# Patient Record
Sex: MALE | Race: WHITE | Employment: OTHER | URBAN - METROPOLITAN AREA
[De-identification: names, ages, dates, MRNs, and addresses within clinical notes are randomized per-mention and may not be internally consistent; named-entity substitution may affect disease eponyms.]

---

## 2017-03-23 ENCOUNTER — HOSPITAL ENCOUNTER (OUTPATIENT)
Age: 72
Setting detail: SPECIMEN
Discharge: HOME | End: 2017-03-23
Payer: MEDICARE

## 2017-03-23 LAB
ALBUMIN SERPL-MCNC: 4 G/DL (ref 3.5–5.2)
ALBUMIN/GLOBULIN RATIO: 1.4 (ref 1–2.5)
ALP BLD-CCNC: 123 U/L (ref 40–129)
ALT SERPL-CCNC: 24 U/L (ref 5–41)
ANION GAP SERPL CALCULATED.3IONS-SCNC: 13 MMOL/L (ref 9–17)
AST SERPL-CCNC: 16 U/L
BILIRUB SERPL-MCNC: 0.56 MG/DL (ref 0.3–1.2)
BILIRUBIN URINE: NEGATIVE
BUN BLDV-MCNC: 12 MG/DL (ref 8–23)
BUN/CREAT BLD: ABNORMAL (ref 9–20)
C-REACTIVE PROTEIN: 2.9 MG/L (ref 0–5)
CALCIUM SERPL-MCNC: 9.3 MG/DL (ref 8.6–10.4)
CHLORIDE BLD-SCNC: 103 MMOL/L (ref 98–107)
CHOLESTEROL/HDL RATIO: 2.7
CHOLESTEROL: 166 MG/DL
CO2: 25 MMOL/L (ref 20–31)
COLOR: YELLOW
COMMENT UA: NORMAL
CREAT SERPL-MCNC: 0.67 MG/DL (ref 0.7–1.2)
GFR AFRICAN AMERICAN: >60 ML/MIN
GFR NON-AFRICAN AMERICAN: >60 ML/MIN
GFR SERPL CREATININE-BSD FRML MDRD: ABNORMAL ML/MIN/{1.73_M2}
GFR SERPL CREATININE-BSD FRML MDRD: ABNORMAL ML/MIN/{1.73_M2}
GLUCOSE BLD-MCNC: 99 MG/DL (ref 70–99)
GLUCOSE URINE: NEGATIVE
HCT VFR BLD CALC: 41.6 % (ref 41–53)
HDLC SERPL-MCNC: 61 MG/DL
HEMOGLOBIN: 14 G/DL (ref 13.5–17.5)
KETONES, URINE: NEGATIVE
LDL CHOLESTEROL: 92 MG/DL (ref 0–130)
LEUKOCYTE ESTERASE, URINE: NEGATIVE
MCH RBC QN AUTO: 30.7 PG (ref 26–34)
MCHC RBC AUTO-ENTMCNC: 33.5 G/DL (ref 31–37)
MCV RBC AUTO: 91.5 FL (ref 80–100)
NITRITE, URINE: NEGATIVE
PDW BLD-RTO: 13.8 % (ref 12.5–15.4)
PH UA: 6.5 (ref 5–8)
PLATELET # BLD: 168 K/UL (ref 140–450)
PMV BLD AUTO: 8.8 FL (ref 6–12)
POTASSIUM SERPL-SCNC: 4.5 MMOL/L (ref 3.7–5.3)
PROSTATE SPECIFIC ANTIGEN: 0.6 UG/L
PROTEIN UA: NEGATIVE
RBC # BLD: 4.55 M/UL (ref 4.5–5.9)
SODIUM BLD-SCNC: 141 MMOL/L (ref 135–144)
SPECIFIC GRAVITY UA: 1.02 (ref 1–1.03)
TOTAL PROTEIN: 6.9 G/DL (ref 6.4–8.3)
TRIGL SERPL-MCNC: 66 MG/DL
TSH SERPL DL<=0.05 MIU/L-ACNC: 1.71 MIU/L (ref 0.3–5)
TURBIDITY: CLEAR
URINE HGB: NEGATIVE
UROBILINOGEN, URINE: NORMAL
VLDLC SERPL CALC-MCNC: NORMAL MG/DL (ref 1–30)
WBC # BLD: 5.1 K/UL (ref 3.5–11)

## 2017-09-14 ENCOUNTER — HOSPITAL ENCOUNTER (OUTPATIENT)
Age: 72
Setting detail: SPECIMEN
Discharge: HOME | End: 2017-09-14
Payer: MEDICARE

## 2017-09-14 LAB
ANION GAP SERPL CALCULATED.3IONS-SCNC: 13 MMOL/L (ref 9–17)
BUN BLDV-MCNC: 20 MG/DL (ref 8–23)
BUN/CREAT BLD: ABNORMAL (ref 9–20)
CALCIUM SERPL-MCNC: 9.2 MG/DL (ref 8.6–10.4)
CHLORIDE BLD-SCNC: 109 MMOL/L (ref 98–107)
CO2: 25 MMOL/L (ref 20–31)
CREAT SERPL-MCNC: 0.84 MG/DL (ref 0.7–1.2)
GFR AFRICAN AMERICAN: >60 ML/MIN
GFR NON-AFRICAN AMERICAN: >60 ML/MIN
GFR SERPL CREATININE-BSD FRML MDRD: ABNORMAL ML/MIN/{1.73_M2}
GFR SERPL CREATININE-BSD FRML MDRD: ABNORMAL ML/MIN/{1.73_M2}
GLUCOSE BLD-MCNC: 104 MG/DL (ref 70–99)
POTASSIUM SERPL-SCNC: 5.2 MMOL/L (ref 3.7–5.3)
SODIUM BLD-SCNC: 147 MMOL/L (ref 135–144)

## 2017-10-30 ENCOUNTER — HOSPITAL ENCOUNTER (OUTPATIENT)
Dept: GENERAL RADIOLOGY | Facility: CLINIC | Age: 72
Discharge: HOME | End: 2017-10-30
Payer: MEDICARE

## 2017-10-30 DIAGNOSIS — M79.674 PAIN OF RIGHT GREAT TOE: ICD-10-CM

## 2017-10-30 PROCEDURE — 73630 X-RAY EXAM OF FOOT: CPT

## 2018-05-23 ENCOUNTER — HOSPITAL ENCOUNTER (OUTPATIENT)
Age: 73
Setting detail: SPECIMEN
Discharge: HOME | End: 2018-05-23
Payer: MEDICARE

## 2018-05-23 LAB
ABSOLUTE EOS #: 0.13 K/UL (ref 0–0.44)
ABSOLUTE IMMATURE GRANULOCYTE: <0.03 K/UL (ref 0–0.3)
ABSOLUTE LYMPH #: 1.77 K/UL (ref 1.1–3.7)
ABSOLUTE MONO #: 0.64 K/UL (ref 0.1–1.2)
ALBUMIN SERPL-MCNC: 4.4 G/DL (ref 3.5–5.2)
ALBUMIN/GLOBULIN RATIO: 1.5 (ref 1–2.5)
ALP BLD-CCNC: 109 U/L (ref 40–129)
ALT SERPL-CCNC: 25 U/L (ref 5–41)
ANION GAP SERPL CALCULATED.3IONS-SCNC: 16 MMOL/L (ref 9–17)
AST SERPL-CCNC: 22 U/L
BASOPHILS # BLD: 0 % (ref 0–2)
BASOPHILS ABSOLUTE: <0.03 K/UL (ref 0–0.2)
BILIRUB SERPL-MCNC: 0.45 MG/DL (ref 0.3–1.2)
BILIRUBIN URINE: NEGATIVE
BUN BLDV-MCNC: 27 MG/DL (ref 8–23)
BUN/CREAT BLD: ABNORMAL (ref 9–20)
C-REACTIVE PROTEIN: 2.7 MG/L (ref 0–5)
CALCIUM SERPL-MCNC: 9.1 MG/DL (ref 8.6–10.4)
CHLORIDE BLD-SCNC: 104 MMOL/L (ref 98–107)
CHOLESTEROL/HDL RATIO: 3
CHOLESTEROL: 134 MG/DL
CO2: 25 MMOL/L (ref 20–31)
COLOR: YELLOW
COMMENT UA: NORMAL
CREAT SERPL-MCNC: 0.96 MG/DL (ref 0.7–1.2)
DIFFERENTIAL TYPE: NORMAL
EOSINOPHILS RELATIVE PERCENT: 2 % (ref 1–4)
GFR AFRICAN AMERICAN: >60 ML/MIN
GFR NON-AFRICAN AMERICAN: >60 ML/MIN
GFR SERPL CREATININE-BSD FRML MDRD: ABNORMAL ML/MIN/{1.73_M2}
GFR SERPL CREATININE-BSD FRML MDRD: ABNORMAL ML/MIN/{1.73_M2}
GLUCOSE BLD-MCNC: 103 MG/DL (ref 70–99)
GLUCOSE URINE: NEGATIVE
HCT VFR BLD CALC: 44.2 % (ref 40.7–50.3)
HDLC SERPL-MCNC: 45 MG/DL
HEMOGLOBIN: 14.2 G/DL (ref 13–17)
IMMATURE GRANULOCYTES: 0 %
KETONES, URINE: NEGATIVE
LDL CHOLESTEROL: 68 MG/DL (ref 0–130)
LEUKOCYTE ESTERASE, URINE: NEGATIVE
LYMPHOCYTES # BLD: 33 % (ref 24–43)
MCH RBC QN AUTO: 30.9 PG (ref 25.2–33.5)
MCHC RBC AUTO-ENTMCNC: 32.1 G/DL (ref 28.4–34.8)
MCV RBC AUTO: 96.3 FL (ref 82.6–102.9)
MONOCYTES # BLD: 12 % (ref 3–12)
NITRITE, URINE: NEGATIVE
NRBC AUTOMATED: 0 PER 100 WBC
PDW BLD-RTO: 12.6 % (ref 11.8–14.4)
PH UA: 5.5 (ref 5–8)
PLATELET # BLD: 207 K/UL (ref 138–453)
PLATELET ESTIMATE: NORMAL
PMV BLD AUTO: 10.7 FL (ref 8.1–13.5)
POTASSIUM SERPL-SCNC: 4.1 MMOL/L (ref 3.7–5.3)
PROSTATE SPECIFIC ANTIGEN: 0.49 UG/L
PROTEIN UA: NEGATIVE
RBC # BLD: 4.59 M/UL (ref 4.21–5.77)
RBC # BLD: NORMAL 10*6/UL
SEG NEUTROPHILS: 53 % (ref 36–65)
SEGMENTED NEUTROPHILS ABSOLUTE COUNT: 2.75 K/UL (ref 1.5–8.1)
SODIUM BLD-SCNC: 145 MMOL/L (ref 135–144)
SPECIFIC GRAVITY UA: 1.02 (ref 1–1.03)
TOTAL PROTEIN: 7.4 G/DL (ref 6.4–8.3)
TRIGL SERPL-MCNC: 106 MG/DL
TSH SERPL DL<=0.05 MIU/L-ACNC: 1.8 MIU/L (ref 0.3–5)
TURBIDITY: CLEAR
URINE HGB: NEGATIVE
UROBILINOGEN, URINE: NORMAL
VLDLC SERPL CALC-MCNC: NORMAL MG/DL (ref 1–30)
WBC # BLD: 5.3 K/UL (ref 3.5–11.3)
WBC # BLD: NORMAL 10*3/UL

## 2022-01-14 ENCOUNTER — OFFICE VISIT (OUTPATIENT)
Dept: PULMONOLOGY | Age: 77
End: 2022-01-14
Payer: MEDICARE

## 2022-01-14 VITALS
SYSTOLIC BLOOD PRESSURE: 106 MMHG | HEART RATE: 61 BPM | BODY MASS INDEX: 33.74 KG/M2 | DIASTOLIC BLOOD PRESSURE: 71 MMHG | RESPIRATION RATE: 17 BRPM | HEIGHT: 67 IN | WEIGHT: 215 LBS | OXYGEN SATURATION: 93 % | TEMPERATURE: 98 F

## 2022-01-14 DIAGNOSIS — E66.09 CLASS 1 OBESITY DUE TO EXCESS CALORIES WITH SERIOUS COMORBIDITY AND BODY MASS INDEX (BMI) OF 33.0 TO 33.9 IN ADULT: ICD-10-CM

## 2022-01-14 DIAGNOSIS — R53.81 PHYSICAL DECONDITIONING: ICD-10-CM

## 2022-01-14 DIAGNOSIS — Z87.891 STOPPED SMOKING WITH GREATER THAN 40 PACK YEAR HISTORY: ICD-10-CM

## 2022-01-14 DIAGNOSIS — J44.9 STAGE 1 MILD COPD BY GOLD CLASSIFICATION (HCC): Primary | ICD-10-CM

## 2022-01-14 PROCEDURE — 1036F TOBACCO NON-USER: CPT | Performed by: INTERNAL MEDICINE

## 2022-01-14 PROCEDURE — 99204 OFFICE O/P NEW MOD 45 MIN: CPT | Performed by: INTERNAL MEDICINE

## 2022-01-14 PROCEDURE — 4040F PNEUMOC VAC/ADMIN/RCVD: CPT | Performed by: INTERNAL MEDICINE

## 2022-01-14 PROCEDURE — G8427 DOCREV CUR MEDS BY ELIG CLIN: HCPCS | Performed by: INTERNAL MEDICINE

## 2022-01-14 PROCEDURE — 3023F SPIROM DOC REV: CPT | Performed by: INTERNAL MEDICINE

## 2022-01-14 PROCEDURE — G8484 FLU IMMUNIZE NO ADMIN: HCPCS | Performed by: INTERNAL MEDICINE

## 2022-01-14 PROCEDURE — G8419 CALC BMI OUT NRM PARAM NOF/U: HCPCS | Performed by: INTERNAL MEDICINE

## 2022-01-14 PROCEDURE — 1123F ACP DISCUSS/DSCN MKR DOCD: CPT | Performed by: INTERNAL MEDICINE

## 2022-01-14 RX ORDER — CLOPIDOGREL BISULFATE 75 MG/1
TABLET ORAL
COMMUNITY
Start: 2021-10-08

## 2022-01-14 RX ORDER — LISINOPRIL 40 MG/1
TABLET ORAL
COMMUNITY
Start: 2021-07-09

## 2022-01-14 RX ORDER — UMECLIDINIUM BROMIDE AND VILANTEROL TRIFENATATE 62.5; 25 UG/1; UG/1
1 POWDER RESPIRATORY (INHALATION) EVERY MORNING
Qty: 1 EACH | Refills: 11 | Status: SHIPPED | OUTPATIENT
Start: 2022-01-14

## 2022-01-14 RX ORDER — ASPIRIN 81 MG/1
TABLET ORAL
COMMUNITY

## 2022-01-14 RX ORDER — NAPROXEN SODIUM 220 MG
220 TABLET ORAL PRN
COMMUNITY

## 2022-01-14 RX ORDER — ATORVASTATIN CALCIUM 20 MG/1
TABLET, FILM COATED ORAL
COMMUNITY
Start: 2021-07-08

## 2022-01-14 RX ORDER — HYDROCHLOROTHIAZIDE 25 MG/1
TABLET ORAL
COMMUNITY
Start: 2021-10-25

## 2022-01-14 RX ORDER — HYDROCODONE BITARTRATE AND ACETAMINOPHEN 5; 325 MG/1; MG/1
TABLET ORAL
COMMUNITY

## 2022-01-14 RX ORDER — NITROGLYCERIN 0.4 MG/1
TABLET SUBLINGUAL
COMMUNITY

## 2022-01-14 ASSESSMENT — SLEEP AND FATIGUE QUESTIONNAIRES
HOW LIKELY ARE YOU TO NOD OFF OR FALL ASLEEP WHILE SITTING AND READING: 3
HOW LIKELY ARE YOU TO NOD OFF OR FALL ASLEEP WHEN YOU ARE A PASSENGER IN A CAR FOR AN HOUR WITHOUT A BREAK: 2
HOW LIKELY ARE YOU TO NOD OFF OR FALL ASLEEP WHILE SITTING INACTIVE IN A PUBLIC PLACE: 3
HOW LIKELY ARE YOU TO NOD OFF OR FALL ASLEEP WHILE SITTING AND TALKING TO SOMEONE: 2
HOW LIKELY ARE YOU TO NOD OFF OR FALL ASLEEP IN A CAR, WHILE STOPPED FOR A FEW MINUTES IN TRAFFIC: 1
HOW LIKELY ARE YOU TO NOD OFF OR FALL ASLEEP WHILE WATCHING TV: 2
ESS TOTAL SCORE: 17
HOW LIKELY ARE YOU TO NOD OFF OR FALL ASLEEP WHILE SITTING QUIETLY AFTER LUNCH WITHOUT ALCOHOL: 1
HOW LIKELY ARE YOU TO NOD OFF OR FALL ASLEEP WHILE LYING DOWN TO REST IN THE AFTERNOON WHEN CIRCUMSTANCES PERMIT: 3

## 2022-01-14 ASSESSMENT — ENCOUNTER SYMPTOMS
ALLERGIC/IMMUNOLOGIC NEGATIVE: 1
CHEST TIGHTNESS: 1
SHORTNESS OF BREATH: 1
EYES NEGATIVE: 1
GASTROINTESTINAL NEGATIVE: 1

## 2022-01-14 NOTE — PROGRESS NOTES
Subjective:      Patient ID: Rangel Boyd is a 67 y.o. male being seen in my clinic for   Chief Complaint   Patient presents with    COPD     new patient       HPI  New patient visit, referred by Dr. Pedersen, for evaluation and management of COPD. Patient reports a longstanding history of exertional dyspnea. States that he is currently short of breath with mild to moderate exertion. Has difficulty climbing a flight of stairs. Has some difficulty with activities of daily living. Uses albuterol aerosols as needed. States that he finds this helpful. Has previously been on inhalers but has abandoned the medications due to lack of efficacy and expense. Patient quit smoking in 2017 after approximately a 50-pack-year history. Previously smoked about a pack of cigarettes per day. During his last years he smoked a pipe. Was able to quit smoking by vaping. No longer. Retired . Denies childhood asthma or allergies. Denies cough, wheeze, or sputum production. Most recent CT scan of the chest done at [facility] in July 2021 reviewed. Category 1. No nodules. Background changes of emphysema, primarily upper lobes. History of hypertension. No family history of lung disease. · Spirometry done 12/9/20:  FVC  2.70   L,  73 % predicted  FEV1  1.86  L,  70 % predicted  FEV1/FVC 69    Spirometry is consistent with obstruction with moderate reduction in FEV1 and FVC. There is also likely a component of restriction given the reduction in FVC as well. Complete lung volumes could be done for further evaluation of concomitant restrictive disease. Current Outpatient Medications   Medication Sig Dispense Refill    aspirin 81 MG EC tablet Adult Low Dose Aspirin 81 mg tablet,delayed release   Take 1 tablet every day by oral route.       atorvastatin (LIPITOR) 20 MG tablet atorvastatin 20 mg tablet   TAKE 1 TABLET BY MOUTH EVERY DAY      vitamin D 25 MCG (1000 UT) CAPS Vitamin D3 25 mcg (1,000 unit) capsule   Take 1 capsule every day by oral route with meals for 100 days.  clopidogrel (PLAVIX) 75 MG tablet clopidogrel 75 mg tablet   TAKE 1 TABLET BY MOUTH EVERY DAY      hydroCHLOROthiazide (HYDRODIURIL) 25 MG tablet hydrochlorothiazide 25 mg tablet   TAKE 1 TABLET BY MOUTH EVERY DAY AS DIRECTED      HYDROcodone-acetaminophen (NORCO) 5-325 MG per tablet hydrocodone 5 mg-acetaminophen 325 mg tablet      lisinopril (PRINIVIL;ZESTRIL) 40 MG tablet lisinopril 40 mg tablet   TAKE 1 TABLET EVERY DAY (APPOINTMENT IS NEEDED)      metoprolol tartrate (LOPRESSOR) 25 MG tablet       nitroGLYCERIN (NITROSTAT) 0.4 MG SL tablet Nitrostat 0.4 mg sublingual tablet   Place 1 tablet as needed by sublingual route.  umeclidinium-vilanterol (ANORO ELLIPTA) 62.5-25 MCG/INH AEPB inhaler Inhale 1 puff into the lungs every morning 1 each 11    naproxen sodium (ALEVE) 220 MG tablet Take 220 mg by mouth as needed (Patient not taking: Reported on 2022)      psyllium (METAMUCIL) 58.6 % packet Take 1 packet by mouth daily (Patient not taking: Reported on 2022)       No current facility-administered medications for this visit. History reviewed. No pertinent family history. Social History     Tobacco Use    Smoking status: Former Smoker     Packs/day: 1.00     Years: 52.00     Pack years: 52.00     Types: Pipe     Start date:      Quit date: 2017     Years since quittin.0    Smokeless tobacco: Never Used   Vaping Use    Vaping Use: Never used   Substance Use Topics    Alcohol use: Yes     Comment: rare    Drug use: Never       Review of Systems   Constitutional: Negative. HENT: Negative. Eyes: Negative. Respiratory: Positive for chest tightness and shortness of breath. Cardiovascular: Negative. Gastrointestinal: Negative. Endocrine: Negative. Genitourinary: Negative. Musculoskeletal: Negative. Skin: Negative. Allergic/Immunologic: Negative. Neurological: Negative. Hematological: Negative. Psychiatric/Behavioral: Negative. All other systems reviewed and are negative. Objective:     Vitals:    01/14/22 1303   BP: 106/71   Site: Left Upper Arm   Position: Sitting   Pulse: 61   Resp: 17   Temp: 98 °F (36.7 °C)   TempSrc: Temporal   SpO2: 93%   Weight: 215 lb (97.5 kg)   Height: 5' 7\" (1.702 m)     Physical Exam  Vitals and nursing note reviewed. Constitutional:       Appearance: He is well-developed. He is obese. HENT:      Nose: Nose normal. No congestion. Mouth/Throat:      Mouth: Mucous membranes are moist.      Pharynx: Oropharynx is clear. No oropharyngeal exudate. Eyes:      General: No scleral icterus. Conjunctiva/sclera: Conjunctivae normal.   Neck:      Thyroid: No thyromegaly. Vascular: No JVD. Trachea: No tracheal deviation. Cardiovascular:      Rate and Rhythm: Normal rate and regular rhythm. Heart sounds: Normal heart sounds. No murmur heard. No gallop. Pulmonary:      Effort: Pulmonary effort is normal. No respiratory distress. Breath sounds: No wheezing or rales. Comments: AP diameter of chest increased. Thoracic expansion and diaphragmatic excursion diminished. BS diminished and expiratory phase prolonged. No dullness to percussion or tenderness to palpation. Chest:      Chest wall: No tenderness. Abdominal:      Palpations: Abdomen is soft. Tenderness: There is no abdominal tenderness. Musculoskeletal:         General: No deformity. Cervical back: Neck supple. Right lower leg: No edema. Left lower leg: No edema. Lymphadenopathy:      Cervical: No cervical adenopathy. Skin:     General: Skin is warm and dry. Neurological:      Mental Status: He is alert and oriented to person, place, and time.        Wt Readings from Last 3 Encounters:   01/14/22 215 lb (97.5 kg)     Results for orders placed or performed during the hospital encounter of 05/23/18 REPORTED 1 - 30 mg/dL   Psa screening   Result Value Ref Range    PSA 0.49 <4.1 ug/L   TSH without Reflex   Result Value Ref Range    TSH 1.80 0.30 - 5.00 mIU/L   Urinalysis   Result Value Ref Range    Color, UA YELLOW YEL    Turbidity UA CLEAR CLEAR    Glucose, Ur NEGATIVE NEG    Bilirubin Urine NEGATIVE NEG    Ketones, Urine NEGATIVE NEG    Specific Gravity, UA 1.018 1.005 - 1.030    Urine Hgb NEGATIVE NEG    pH, UA 5.5 5.0 - 8.0    Protein, UA NEGATIVE NEG    Urobilinogen, Urine Normal NORM    Nitrite, Urine NEGATIVE NEG    Leukocyte Esterase, Urine NEGATIVE NEG    Urinalysis Comments       Microscopic exam not performed based on chemical results unless requested in       Assessment:      1. Stage 1 mild COPD by GOLD classification ([facility] [address]    2. Stopped smoking with greater than 40 pack year history    3. Class 1 obesity due to excess calories with serious comorbidity and body mass index (BMI) of 33.0 to 33.9 in adult    4. Physical deconditioning      There is no problem list on file for this patient. Plan:      1. Continued smoking cessation. 2. Encouraged regular exercise. Will improve quality of life. 3. Weight loss. 4. Low-dose screening CT scan of the chest next summer. 5. Trial of Anoro 1 puff daily. LABA/LaMA first-line therapy for COPD. 6. May use albuterol as needed. 7. Yearly flu shot and COVID vaccination (up-to-date on both). 8. Broad-spectrum antibiotic for purulent exacerbation. 9. Complete pulmonary function studies. 10. Patient will be wintering in [place]. Return in the spring.   Orders Placed This Encounter   Medications    umeclidinium-vilanterol (ANORO ELLIPTA) 62.5-25 MCG/INH AEPB inhaler     Sig: Inhale 1 puff into the lungs every morning     Dispense:  1 each     Refill:  11     Orders Placed This Encounter   Procedures    Full PFT Study With Bronchodilator     If an ABG is needed along with this PFT procedure, please place the appropriate lab order     Standing Status: Future     Standing Expiration Date:   1/14/2023     Return in about 3 months (around 4/14/2022).      Electronically signed by Mike Doty DO on 1/14/2022 at 2:00 PM

## 2022-03-29 ENCOUNTER — TELEPHONE (OUTPATIENT)
Dept: PULMONOLOGY | Age: 77
End: 2022-03-29

## 2022-03-29 NOTE — TELEPHONE ENCOUNTER
Patient saw Cardiologist and when he listed to his breathing he stated that on the right side it did not sound right. Patient has been coughing more. Please advise.

## 2022-03-30 DIAGNOSIS — J44.1 COPD WITH EXACERBATION (HCC): Primary | ICD-10-CM

## 2022-03-30 RX ORDER — PREDNISONE 10 MG/1
40 TABLET ORAL DAILY
Qty: 20 TABLET | Refills: 0 | Status: SHIPPED | OUTPATIENT
Start: 2022-03-30 | End: 2022-04-01 | Stop reason: SDUPTHER

## 2022-04-01 DIAGNOSIS — J44.1 COPD WITH EXACERBATION (HCC): ICD-10-CM

## 2022-04-01 RX ORDER — AZITHROMYCIN 250 MG/1
250 TABLET, FILM COATED ORAL SEE ADMIN INSTRUCTIONS
Qty: 6 TABLET | Refills: 0 | Status: SHIPPED | OUTPATIENT
Start: 2022-04-01 | End: 2022-04-06

## 2022-04-01 RX ORDER — PREDNISONE 10 MG/1
40 TABLET ORAL DAILY
Qty: 20 TABLET | Refills: 0 | Status: SHIPPED | OUTPATIENT
Start: 2022-04-01 | End: 2022-04-06

## 2022-04-01 NOTE — TELEPHONE ENCOUNTER
Patient called and is still coughing up phlegm and it is yellow/green. He said the steroid helped a little but he is still not feeling well. He would like to know if he can try a z-pack and another round of prednisone; I did pending orders if you agree please sign. Thanks!

## 2022-04-04 ENCOUNTER — TELEPHONE (OUTPATIENT)
Dept: PULMONOLOGY | Age: 77
End: 2022-04-04

## 2022-04-04 DIAGNOSIS — J20.9 ACUTE BRONCHITIS, UNSPECIFIED ORGANISM: Primary | ICD-10-CM

## 2022-04-04 RX ORDER — GUAIFENESIN 600 MG/1
600 TABLET, EXTENDED RELEASE ORAL 2 TIMES DAILY
Qty: 30 TABLET | Refills: 0 | Status: SHIPPED | OUTPATIENT
Start: 2022-04-04 | End: 2022-04-19

## 2022-04-04 NOTE — TELEPHONE ENCOUNTER
Pt stated he has a persistant cough and is still coughing up phlegm. He is wondering if Mucinex will help? He is concerned about possible pneumonia, he is also requesting a CXR? Please advise.

## 2022-04-07 ENCOUNTER — HOSPITAL ENCOUNTER (OUTPATIENT)
Dept: GENERAL RADIOLOGY | Facility: CLINIC | Age: 77
Discharge: HOME | End: 2022-04-09
Payer: MEDICARE

## 2022-04-07 DIAGNOSIS — J20.9 ACUTE BRONCHITIS, UNSPECIFIED ORGANISM: ICD-10-CM

## 2022-04-07 PROCEDURE — 71046 X-RAY EXAM CHEST 2 VIEWS: CPT

## 2022-04-18 ENCOUNTER — OFFICE VISIT (OUTPATIENT)
Dept: PULMONOLOGY | Age: 77
End: 2022-04-18
Payer: MEDICARE

## 2022-04-18 VITALS
TEMPERATURE: 97.4 F | SYSTOLIC BLOOD PRESSURE: 107 MMHG | HEIGHT: 67 IN | BODY MASS INDEX: 33.74 KG/M2 | OXYGEN SATURATION: 93 % | WEIGHT: 215 LBS | DIASTOLIC BLOOD PRESSURE: 73 MMHG | HEART RATE: 74 BPM

## 2022-04-18 DIAGNOSIS — G47.33 OSA (OBSTRUCTIVE SLEEP APNEA): ICD-10-CM

## 2022-04-18 DIAGNOSIS — F51.04 PSYCHOPHYSIOLOGIC INSOMNIA: ICD-10-CM

## 2022-04-18 DIAGNOSIS — E66.09 CLASS 1 OBESITY DUE TO EXCESS CALORIES WITH SERIOUS COMORBIDITY AND BODY MASS INDEX (BMI) OF 33.0 TO 33.9 IN ADULT: ICD-10-CM

## 2022-04-18 DIAGNOSIS — J44.9 STAGE 2 MODERATE COPD BY GOLD CLASSIFICATION (HCC): Primary | ICD-10-CM

## 2022-04-18 PROCEDURE — 94060 EVALUATION OF WHEEZING: CPT | Performed by: INTERNAL MEDICINE

## 2022-04-18 PROCEDURE — 94726 PLETHYSMOGRAPHY LUNG VOLUMES: CPT | Performed by: INTERNAL MEDICINE

## 2022-04-18 PROCEDURE — 1123F ACP DISCUSS/DSCN MKR DOCD: CPT | Performed by: INTERNAL MEDICINE

## 2022-04-18 PROCEDURE — 94729 DIFFUSING CAPACITY: CPT | Performed by: INTERNAL MEDICINE

## 2022-04-18 PROCEDURE — G8427 DOCREV CUR MEDS BY ELIG CLIN: HCPCS | Performed by: INTERNAL MEDICINE

## 2022-04-18 PROCEDURE — 4040F PNEUMOC VAC/ADMIN/RCVD: CPT | Performed by: INTERNAL MEDICINE

## 2022-04-18 PROCEDURE — 99214 OFFICE O/P EST MOD 30 MIN: CPT | Performed by: INTERNAL MEDICINE

## 2022-04-18 PROCEDURE — G8417 CALC BMI ABV UP PARAM F/U: HCPCS | Performed by: INTERNAL MEDICINE

## 2022-04-18 PROCEDURE — 1036F TOBACCO NON-USER: CPT | Performed by: INTERNAL MEDICINE

## 2022-04-18 PROCEDURE — 3023F SPIROM DOC REV: CPT | Performed by: INTERNAL MEDICINE

## 2022-04-18 RX ORDER — UMECLIDINIUM BROMIDE AND VILANTEROL TRIFENATATE 62.5; 25 UG/1; UG/1
1 POWDER RESPIRATORY (INHALATION) DAILY
Qty: 1 EACH | Refills: 0 | COMMUNITY
Start: 2022-04-18

## 2022-04-18 RX ORDER — TRAZODONE HYDROCHLORIDE 50 MG/1
50 TABLET ORAL NIGHTLY
Qty: 30 TABLET | Refills: 11 | Status: SHIPPED | OUTPATIENT
Start: 2022-04-18 | End: 2022-10-21 | Stop reason: SDUPTHER

## 2022-04-18 ASSESSMENT — ENCOUNTER SYMPTOMS
APNEA: 1
EYES NEGATIVE: 1
SHORTNESS OF BREATH: 1

## 2022-04-18 NOTE — PROGRESS NOTES
Pulmonary function study:    Interpretation:    Spirometry demonstrates a moderate restrictive ventilatory defect. A significant bronchospastic component is identified. FVC 1.98 L (60%), FEV1 1.32 L (51%), FEV1/FVC 66%. After bronchodilator there is a significant increase. FEV1 1.51 L (15% increase). Static lung volumes as measured by body plethysmography are normal.  FRC 3.25 L (109%), RV 2.88 L (117%), TLC 5.18 L (91%). RV/TLC ratio was increased at 56%. Airways resistance is markedly increased. Diffusing capacity markedly decreased. DLCO 11.28 (49%). Oxygen saturation on room air is normal.  Transcutaneous hemoglobin is slightly elevated. Impression: study is consistent with the clinical diagnosis of stage II COPD.

## 2022-04-19 NOTE — PROGRESS NOTES
Subjective:      Patient ID: Adela Sung is a 67 y.o. male being seen in my clinic for   Chief Complaint   Patient presents with    COPD       HPI  Follow-up visit for COPD. Since his last visit in January he had pulmonary function studies done today which demonstrated stage II COPD. Actually some mild deterioration when compared to previous studies done in 2020. Chest x-ray done on 4/7/2021 reviewed. Lung fields clear. Heart size is not enlarged. No hilar or mediastinal abnormalities. No lung nodules. Degenerative changes on his thoracic spine. Last screening CT scan of the chest was July 2021. Patient states that he is only using albuterol. States he cannot afford Anoro. Seem to help him when he was on it. Shortness of breath baseline. Moderate to heavy exertion. Reports longstanding insomnia. Uses melatonin but does not seem to help much. Also has sleep apnea. Intolerant of CPAP. Takes naps during the day. Loud snoring. Disturbed sleep. Review of Systems   Constitutional: Negative. HENT: Negative. Eyes: Negative. Respiratory: Positive for apnea and shortness of breath. Cardiovascular: Negative. Psychiatric/Behavioral: Positive for sleep disturbance. All other systems reviewed and are negative.       Objective:     Vitals:    04/18/22 1524   BP: 107/73   Site: Left Upper Arm   Position: Sitting   Cuff Size: Large Adult   Pulse: 74   Temp: 97.4 °F (36.3 °C)   SpO2: 93%   Weight: 215 lb (97.5 kg)   Height: 5' 7\" (1.702 m)     Current Outpatient Medications   Medication Sig Dispense Refill    traZODone (DESYREL) 50 MG tablet Take 1 tablet by mouth nightly 1/2 tab qhs x 1 wk, then 1 tab qhs thereafter for sleep 30 tablet 11    umeclidinium-vilanterol (ANORO ELLIPTA) 62.5-25 MCG/INH AEPB inhaler Inhale 1 puff into the lungs daily JN5U EXP FEB 2023 1 each 0    guaiFENesin (MUCINEX) 600 MG extended release tablet Take 1 tablet by mouth 2 times daily for 15 days 30 tablet 0  aspirin 81 MG EC tablet Adult Low Dose Aspirin 81 mg tablet,delayed release   Take 1 tablet every day by oral route.  atorvastatin (LIPITOR) 20 MG tablet atorvastatin 20 mg tablet   TAKE 1 TABLET BY MOUTH EVERY DAY      vitamin D 25 MCG (1000 UT) CAPS Vitamin D3 25 mcg (1,000 unit) capsule   Take 1 capsule every day by oral route with meals for 100 days.  clopidogrel (PLAVIX) 75 MG tablet clopidogrel 75 mg tablet   TAKE 1 TABLET BY MOUTH EVERY DAY      hydroCHLOROthiazide (HYDRODIURIL) 25 MG tablet hydrochlorothiazide 25 mg tablet   TAKE 1 TABLET BY MOUTH EVERY DAY AS DIRECTED      HYDROcodone-acetaminophen (NORCO) 5-325 MG per tablet hydrocodone 5 mg-acetaminophen 325 mg tablet      lisinopril (PRINIVIL;ZESTRIL) 40 MG tablet lisinopril 40 mg tablet   TAKE 1 TABLET EVERY DAY (APPOINTMENT IS NEEDED)      metoprolol tartrate (LOPRESSOR) 25 MG tablet       naproxen sodium (ALEVE) 220 MG tablet Take 220 mg by mouth as needed       nitroGLYCERIN (NITROSTAT) 0.4 MG SL tablet Nitrostat 0.4 mg sublingual tablet   Place 1 tablet as needed by sublingual route.  psyllium (METAMUCIL) 58.6 % packet Take 1 packet by mouth daily       umeclidinium-vilanterol (ANORO ELLIPTA) 62.5-25 MCG/INH AEPB inhaler Inhale 1 puff into the lungs every morning 1 each 11     No current facility-administered medications for this visit. Physical Exam  Vitals and nursing note reviewed. Constitutional:       Appearance: He is well-developed. He is obese. HENT:      Mouth/Throat:      Mouth: Mucous membranes are moist.      Pharynx: Oropharynx is clear. Comments: Large tongue , low hanging soft palate and large uvula. Overall pharyngeal orifice moderately decreased    Eyes:      General: No scleral icterus. Conjunctiva/sclera: Conjunctivae normal.   Neck:      Thyroid: No thyromegaly. Vascular: No JVD. Trachea: No tracheal deviation.    Cardiovascular:      Rate and Rhythm: Normal rate and regular rhythm. Heart sounds: Normal heart sounds. No murmur heard. No gallop. Pulmonary:      Effort: Respiratory distress present. Breath sounds: No wheezing or rales. Comments: AP diameter of chest increased. Thoracic expansion and diaphragmatic excursion diminished. BS diminished and expiratory phase prolonged. No dullness to percussion or tenderness to palpation. Chest:      Chest wall: No tenderness. Abdominal:      Palpations: Abdomen is soft. Tenderness: There is no abdominal tenderness. Musculoskeletal:      Cervical back: Neck supple. Right lower leg: No edema. Left lower leg: No edema. Lymphadenopathy:      Cervical: No cervical adenopathy. Skin:     General: Skin is warm and dry. Neurological:      Mental Status: He is alert and oriented to person, place, and time.        Wt Readings from Last 3 Encounters:   04/18/22 215 lb (97.5 kg)   01/14/22 215 lb (97.5 kg)     Results for orders placed or performed during the hospital encounter of 05/23/18   CBC Auto Differential   Result Value Ref Range    WBC 5.3 3.5 - 11.3 k/uL    RBC 4.59 4.21 - 5.77 m/uL    Hemoglobin 14.2 13.0 - 17.0 g/dL    Hematocrit 44.2 40.7 - 50.3 %    MCV 96.3 82.6 - 102.9 fL    MCH 30.9 25.2 - 33.5 pg    MCHC 32.1 28.4 - 34.8 g/dL    RDW 12.6 11.8 - 14.4 %    Platelets 643 485 - 386 k/uL    MPV 10.7 8.1 - 13.5 fL    NRBC Automated 0.0 0.0 per 100 WBC    Differential Type NOT REPORTED     Seg Neutrophils 53 36 - 65 %    Lymphocytes 33 24 - 43 %    Monocytes 12 3 - 12 %    Eosinophils % 2 1 - 4 %    Basophils 0 0 - 2 %    Immature Granulocytes 0 0 %    Segs Absolute 2.75 1.50 - 8.10 k/uL    Absolute Lymph # 1.77 1.10 - 3.70 k/uL    Absolute Mono # 0.64 0.10 - 1.20 k/uL    Absolute Eos # 0.13 0.00 - 0.44 k/uL    Basophils Absolute <0.03 0.00 - 0.20 k/uL    Absolute Immature Granulocyte <0.03 0.00 - 0.30 k/uL    WBC Morphology NOT REPORTED     RBC Morphology NOT REPORTED     Platelet Estimate NOT REPORTED    Comprehensive Metabolic Panel   Result Value Ref Range    Glucose 103 (H) 70 - 99 mg/dL    BUN 27 (H) 8 - 23 mg/dL    CREATININE 0.96 0.70 - 1.20 mg/dL    Bun/Cre Ratio NOT REPORTED 9 - 20    Calcium 9.1 8.6 - 10.4 mg/dL    Sodium 145 (H) 135 - 144 mmol/L    Potassium 4.1 3.7 - 5.3 mmol/L    Chloride 104 98 - 107 mmol/L    CO2 25 20 - 31 mmol/L    Anion Gap 16 9 - 17 mmol/L    Alkaline Phosphatase 109 40 - 129 U/L    ALT 25 5 - 41 U/L    AST 22 <40 U/L    Total Bilirubin 0.45 0.3 - 1.2 mg/dL    Total Protein 7.4 6.4 - 8.3 g/dL    Albumin 4.4 3.5 - 5.2 g/dL    Albumin/Globulin Ratio 1.5 1.0 - 2.5    GFR Non-African American >60 >60 mL/min    GFR African American >60 >60 mL/min    GFR Comment          GFR Staging NOT REPORTED    C-Reactive Protein   Result Value Ref Range    CRP 2.7 0.0 - 5.0 mg/L   Lipid Panel   Result Value Ref Range    Cholesterol 134 <200 mg/dL    HDL 45 >40 mg/dL    LDL Cholesterol 68 0 - 130 mg/dL    Chol/HDL Ratio 3.0 <5    Triglycerides 106 <150 mg/dL    VLDL NOT REPORTED 1 - 30 mg/dL   Psa screening   Result Value Ref Range    PSA 0.49 <4.1 ug/L   TSH without Reflex   Result Value Ref Range    TSH 1.80 0.30 - 5.00 mIU/L   Urinalysis   Result Value Ref Range    Color, UA YELLOW YEL    Turbidity UA CLEAR CLEAR    Glucose, Ur NEGATIVE NEG    Bilirubin Urine NEGATIVE NEG    Ketones, Urine NEGATIVE NEG    Specific Gravity, UA 1.018 1.005 - 1.030    Urine Hgb NEGATIVE NEG    pH, UA 5.5 5.0 - 8.0    Protein, UA NEGATIVE NEG    Urobilinogen, Urine Normal NORM    Nitrite, Urine NEGATIVE NEG    Leukocyte Esterase, Urine NEGATIVE NEG    Urinalysis Comments       Microscopic exam not performed based on chemical results unless requested in       :      1. Stage 2 moderate COPD by GOLD classification ([facility] [address]    2. RO (obstructive sleep apnea)    3. Psychophysiologic insomnia    4.  Class 1 obesity due to excess calories with serious comorbidity and body mass index (BMI) of 33.0 to 33.9 in adult There is no problem list on file for this patient. Plan:      1. Samples of Anoro. 2. Weight loss. Will help sleep apnea and shortness of breath. 3. Strongly encourage CPAP use. New mask tubing and supplies. 4. Discussed predisposing, precipitating, and perpetuating factors in chronic insomnia. 5. Begin trazodone 25 mg nightly for 1 week then 50 mg nightly thereafter. 6. Low-dose screening CT scan of the chest in the summer. 7. Encouraged regular exercise. 8. Return in 6 months. Orders Placed This Encounter   Medications    traZODone (DESYREL) 50 MG tablet     Sig: Take 1 tablet by mouth nightly 1/2 tab qhs x 1 wk, then 1 tab qhs thereafter for sleep     Dispense:  30 tablet     Refill:  11    umeclidinium-vilanterol (ANORO ELLIPTA) 62.5-25 MCG/INH AEPB inhaler     Sig: Inhale 1 puff into the lungs daily JN5U EXP FEB 2023     Dispense:  1 each     Refill:  0     Orders Placed This Encounter   Procedures    CA DIFFUSING CAPACITY    CA BREATHING CAPACITY TEST EVAL BRONCHOSPASM EVAL AFTER BRONCHODIALTOR    CA PLETHYSMOGRAPHY LUNG VOLUMES W/WO AIRWAY RESIST    CA DURABLE MEDICAL EQUIPMENT MI     New CPAP mask tubing and supplies. Return in about 6 months (around 10/18/2022).        Electronically signed by Timothy Singh DO on 4/18/2022at 10:13 PM

## 2022-05-10 DIAGNOSIS — F51.04 PSYCHOPHYSIOLOGIC INSOMNIA: ICD-10-CM

## 2022-05-10 RX ORDER — TRAZODONE HYDROCHLORIDE 50 MG/1
TABLET ORAL
Qty: 30 TABLET | Refills: 11 | OUTPATIENT
Start: 2022-05-10

## 2022-10-21 ENCOUNTER — OFFICE VISIT (OUTPATIENT)
Dept: PULMONOLOGY | Age: 77
End: 2022-10-21
Payer: MEDICARE

## 2022-10-21 VITALS
SYSTOLIC BLOOD PRESSURE: 114 MMHG | BODY MASS INDEX: 32.65 KG/M2 | WEIGHT: 208 LBS | RESPIRATION RATE: 16 BRPM | HEIGHT: 67 IN | DIASTOLIC BLOOD PRESSURE: 70 MMHG | HEART RATE: 79 BPM | OXYGEN SATURATION: 92 % | TEMPERATURE: 97.8 F

## 2022-10-21 DIAGNOSIS — F51.04 PSYCHOPHYSIOLOGIC INSOMNIA: ICD-10-CM

## 2022-10-21 DIAGNOSIS — G47.33 OSA (OBSTRUCTIVE SLEEP APNEA): ICD-10-CM

## 2022-10-21 DIAGNOSIS — E66.09 CLASS 1 OBESITY DUE TO EXCESS CALORIES WITH SERIOUS COMORBIDITY AND BODY MASS INDEX (BMI) OF 32.0 TO 32.9 IN ADULT: ICD-10-CM

## 2022-10-21 DIAGNOSIS — J44.9 STAGE 1 MILD COPD BY GOLD CLASSIFICATION (HCC): Primary | ICD-10-CM

## 2022-10-21 PROCEDURE — 1036F TOBACCO NON-USER: CPT | Performed by: INTERNAL MEDICINE

## 2022-10-21 PROCEDURE — 3023F SPIROM DOC REV: CPT | Performed by: INTERNAL MEDICINE

## 2022-10-21 PROCEDURE — G8484 FLU IMMUNIZE NO ADMIN: HCPCS | Performed by: INTERNAL MEDICINE

## 2022-10-21 PROCEDURE — 99213 OFFICE O/P EST LOW 20 MIN: CPT | Performed by: INTERNAL MEDICINE

## 2022-10-21 PROCEDURE — G8427 DOCREV CUR MEDS BY ELIG CLIN: HCPCS | Performed by: INTERNAL MEDICINE

## 2022-10-21 PROCEDURE — G8417 CALC BMI ABV UP PARAM F/U: HCPCS | Performed by: INTERNAL MEDICINE

## 2022-10-21 PROCEDURE — 1123F ACP DISCUSS/DSCN MKR DOCD: CPT | Performed by: INTERNAL MEDICINE

## 2022-10-21 RX ORDER — TRAZODONE HYDROCHLORIDE 50 MG/1
100 TABLET ORAL NIGHTLY
Qty: 60 TABLET | Refills: 11 | Status: SHIPPED | OUTPATIENT
Start: 2022-10-21 | End: 2023-10-21

## 2022-10-21 ASSESSMENT — ENCOUNTER SYMPTOMS
COUGH: 1
SHORTNESS OF BREATH: 1
EYE ITCHING: 1

## 2022-10-21 NOTE — PROGRESS NOTES
Subjective:      Patient ID: Rosy Richards is a 65 y.o. male being seen in my clinic for   Chief Complaint   Patient presents with    COPD    Follow-up       HPI  Follow-up for stage I COPD and psychophysiologic insomnia. Since his last visit a year ago states that he is no longer using Anoro. Short of breath with moderate to heavy exertion. Claims co-pay for Anoro too high. Uses aerosols. Not clear if albuterol or albuterol/ipratropium. Generally uses about 3 times a week. States that when he uses this medication and improves his breathing. Daily cough productive of mucoid phlegm. Denies hemoptysis. Quit smoking about 5 years ago after a 50-pack-year history. No recent low-dose screening CT scan of the chest.  Patient received a COVID booster on July 25 (not bivalent). I see no record of a flu shot this year. Patient claims that he has gotten both of these recently. Patient is completely intolerant of CPAP. Denies symptoms of sleep apnea. Reportedly sleep study a number of years ago at [facility] clinic. At one time was on CPAP. Mostly complains about psychophysiologic insomnia. He has been on trazodone 50 mg nightly but finds it ineffective. Not clear whether sleep-wake schedule is regular. States that he usually eats before he goes to bed. Has some symptoms of reflux as well. Review of Systems   Constitutional: Negative. HENT: Negative. Eyes:  Positive for itching. Respiratory:  Positive for cough and shortness of breath. Cardiovascular: Negative. Psychiatric/Behavioral:  Positive for sleep disturbance. All other systems reviewed and are negative.     Objective:     Vitals:    10/21/22 1451   BP: 114/70   Site: Left Upper Arm   Position: Sitting   Cuff Size: Large Adult   Pulse: 79   Resp: 16   Temp: 97.8 °F (36.6 °C)   TempSrc: Temporal   SpO2: 92%   Weight: 208 lb (94.3 kg)   Height: 5' 7\" (1.702 m)     Current Outpatient Medications   Medication Sig Dispense Refill traZODone (DESYREL) 50 MG tablet Take 2 tablets by mouth nightly 60 tablet 11    umeclidinium-vilanterol (ANORO ELLIPTA) 62.5-25 MCG/INH AEPB inhaler Inhale 1 puff into the lungs daily [id number] EXP FEB 2023 1 each 0    aspirin 81 MG EC tablet Adult Low Dose Aspirin 81 mg tablet,delayed release   Take 1 tablet every day by oral route. atorvastatin (LIPITOR) 20 MG tablet atorvastatin 20 mg tablet   TAKE 1 TABLET BY MOUTH EVERY DAY      vitamin D 25 MCG (1000 UT) CAPS Vitamin D3 25 mcg (1,000 unit) capsule   Take 1 capsule every day by oral route with meals for 100 days. clopidogrel (PLAVIX) 75 MG tablet clopidogrel 75 mg tablet   TAKE 1 TABLET BY MOUTH EVERY DAY      hydroCHLOROthiazide (HYDRODIURIL) 25 MG tablet hydrochlorothiazide 25 mg tablet   TAKE 1 TABLET BY MOUTH EVERY DAY AS DIRECTED      HYDROcodone-acetaminophen (NORCO) 5-325 MG per tablet hydrocodone 5 mg-acetaminophen 325 mg tablet      lisinopril (PRINIVIL;ZESTRIL) 40 MG tablet lisinopril 40 mg tablet   TAKE 1 TABLET EVERY DAY (APPOINTMENT IS NEEDED)      metoprolol tartrate (LOPRESSOR) 25 MG tablet       naproxen sodium (ANAPROX) 220 MG tablet Take 220 mg by mouth as needed       nitroGLYCERIN (NITROSTAT) 0.4 MG SL tablet Nitrostat 0.4 mg sublingual tablet   Place 1 tablet as needed by sublingual route. psyllium (METAMUCIL) 58.6 % packet Take 1 packet by mouth daily       umeclidinium-vilanterol (ANORO ELLIPTA) 62.5-25 MCG/INH AEPB inhaler Inhale 1 puff into the lungs every morning 1 each 11     No current facility-administered medications for this visit. Physical Exam  Vitals and nursing note reviewed. Constitutional:       Appearance: He is well-developed. He is obese. HENT:      Nose: Nose normal. No congestion. Mouth/Throat:      Mouth: Mucous membranes are moist.      Pharynx: Oropharynx is clear. No oropharyngeal exudate. Comments: Large tongue , low hanging soft palate and large uvula.  Overall pharyngeal orifice moderately decreased    Eyes:      General: No scleral icterus. Conjunctiva/sclera: Conjunctivae normal.   Neck:      Thyroid: No thyromegaly. Vascular: No JVD. Trachea: No tracheal deviation. Cardiovascular:      Rate and Rhythm: Normal rate and regular rhythm. Heart sounds: Normal heart sounds. No murmur heard. No gallop. Pulmonary:      Effort: Pulmonary effort is normal. No respiratory distress. Breath sounds: No wheezing or rales. Chest:      Chest wall: No tenderness. Abdominal:      Palpations: Abdomen is soft. Tenderness: There is no abdominal tenderness. Musculoskeletal:      Cervical back: Neck supple. Right lower leg: No edema. Left lower leg: No edema. Comments: No clubbing   Lymphadenopathy:      Cervical: No cervical adenopathy. Skin:     General: Skin is warm and dry. Neurological:      Mental Status: He is alert and oriented to person, place, and time.      Wt Readings from Last 3 Encounters:   10/21/22 208 lb (94.3 kg)   04/18/22 215 lb (97.5 kg)   01/14/22 215 lb (97.5 kg)     Results for orders placed or performed during the hospital encounter of 05/23/18   CBC Auto Differential   Result Value Ref Range    WBC 5.3 3.5 - 11.3 k/uL    RBC 4.59 4.21 - 5.77 m/uL    Hemoglobin 14.2 13.0 - 17.0 g/dL    Hematocrit 44.2 40.7 - 50.3 %    MCV 96.3 82.6 - 102.9 fL    MCH 30.9 25.2 - 33.5 pg    MCHC 32.1 28.4 - 34.8 g/dL    RDW 12.6 11.8 - 14.4 %    Platelets 345 045 - 619 k/uL    MPV 10.7 8.1 - 13.5 fL    NRBC Automated 0.0 0.0 per 100 WBC    Differential Type NOT REPORTED     Seg Neutrophils 53 36 - 65 %    Lymphocytes 33 24 - 43 %    Monocytes 12 3 - 12 %    Eosinophils % 2 1 - 4 %    Basophils 0 0 - 2 %    Immature Granulocytes 0 0 %    Segs Absolute 2.75 1.50 - 8.10 k/uL    Absolute Lymph # 1.77 1.10 - 3.70 k/uL    Absolute Mono # 0.64 0.10 - 1.20 k/uL    Absolute Eos # 0.13 0.00 - 0.44 k/uL    Basophils Absolute <0.03 0.00 - 0.20 k/uL Absolute Immature Granulocyte <0.03 0.00 - 0.30 k/uL    WBC Morphology NOT REPORTED     RBC Morphology NOT REPORTED     Platelet Estimate NOT REPORTED    Comprehensive Metabolic Panel   Result Value Ref Range    Glucose 103 (H) 70 - 99 mg/dL    BUN 27 (H) 8 - 23 mg/dL    Creatinine 0.96 0.70 - 1.20 mg/dL    Bun/Cre Ratio NOT REPORTED 9 - 20    Calcium 9.1 8.6 - 10.4 mg/dL    Sodium 145 (H) 135 - 144 mmol/L    Potassium 4.1 3.7 - 5.3 mmol/L    Chloride 104 98 - 107 mmol/L    CO2 25 20 - 31 mmol/L    Anion Gap 16 9 - 17 mmol/L    Alkaline Phosphatase 109 40 - 129 U/L    ALT 25 5 - 41 U/L    AST 22 <40 U/L    Total Bilirubin 0.45 0.3 - 1.2 mg/dL    Total Protein 7.4 6.4 - 8.3 g/dL    Albumin 4.4 3.5 - 5.2 g/dL    Albumin/Globulin Ratio 1.5 1.0 - 2.5    GFR Non-African American >60 >60 mL/min    GFR African American >60 >60 mL/min    GFR Comment          GFR Staging NOT REPORTED    C-Reactive Protein   Result Value Ref Range    CRP 2.7 0.0 - 5.0 mg/L   Lipid Panel   Result Value Ref Range    Cholesterol 134 <200 mg/dL    HDL 45 >40 mg/dL    LDL Cholesterol 68 0 - 130 mg/dL    Chol/HDL Ratio 3.0 <5    Triglycerides 106 <150 mg/dL    VLDL NOT REPORTED 1 - 30 mg/dL   Psa screening   Result Value Ref Range    PSA 0.49 <4.1 ug/L   TSH without Reflex   Result Value Ref Range    TSH 1.80 0.30 - 5.00 mIU/L   Urinalysis   Result Value Ref Range    Color, UA YELLOW YEL    Turbidity UA CLEAR CLEAR    Glucose, Ur NEGATIVE NEG    Bilirubin Urine NEGATIVE NEG    Ketones, Urine NEGATIVE NEG    Specific Gravity, UA 1.018 1.005 - 1.030    Urine Hgb NEGATIVE NEG    pH, UA 5.5 5.0 - 8.0    Protein, UA NEGATIVE NEG    Urobilinogen, Urine Normal NORM    Nitrite, Urine NEGATIVE NEG    Leukocyte Esterase, Urine NEGATIVE NEG    Urinalysis Comments       Microscopic exam not performed based on chemical results unless requested in       :      1. Stage 1 mild COPD by GOLD classification ([address]    2. Psychophysiologic insomnia    3.  RO (obstructive sleep apnea)    4. Class 1 obesity due to excess calories with serious comorbidity and body mass index (BMI) of 32.0 to 32.9 in adult      There is no problem list on file for this patient. Plan:      Offered refills of albuterol/ipratropium. Patient states supplies adequate. Increase trazodone 100 mg nightly. Stressed importance of good sleep hygiene. Avoid eating before bedtime. Weight loss. Follow-up with primary care physician regarding eye complaints. Return in 1 year. Orders Placed This Encounter   Medications    traZODone (DESYREL) 50 MG tablet     Sig: Take 2 tablets by mouth nightly     Dispense:  60 tablet     Refill:  11     No orders of the defined types were placed in this encounter. Return in about 1 year (around 10/21/2023).        Electronically signed by Susan Lamas DO on 10/21/2022at 3:21 PM

## 2023-10-04 PROBLEM — R94.31 ELECTROCARDIOGRAM ABNORMAL: Status: ACTIVE | Noted: 2023-10-04

## 2023-10-04 PROBLEM — I27.20 PULMONARY HYPERTENSION (HCC): Status: ACTIVE | Noted: 2023-10-04

## 2023-10-04 PROBLEM — N13.30 HYDRONEPHROSIS OF RIGHT KIDNEY: Status: ACTIVE | Noted: 2020-07-29

## 2023-10-04 PROBLEM — I25.10 ARTERIOSCLEROSIS OF CORONARY ARTERY: Status: ACTIVE | Noted: 2020-01-02

## 2023-10-04 PROBLEM — N40.1 BENIGN PROSTATIC HYPERPLASIA WITH LOWER URINARY TRACT SYMPTOMS: Status: ACTIVE | Noted: 2020-07-29

## 2023-10-04 PROBLEM — K44.9 HIATAL HERNIA: Status: ACTIVE | Noted: 2020-07-08

## 2023-10-04 PROBLEM — I48.0 PAROXYSMAL ATRIAL FIBRILLATION (HCC): Status: ACTIVE | Noted: 2021-07-16

## 2023-10-04 PROBLEM — Z95.5 STENTED CORONARY ARTERY: Status: ACTIVE | Noted: 2023-10-04

## 2023-10-04 PROBLEM — I83.813 PAIN DUE TO VARICOSE VEINS OF BOTH LOWER EXTREMITIES: Status: ACTIVE | Noted: 2023-10-04

## 2023-10-04 PROBLEM — I87.2 PERIPHERAL VENOUS INSUFFICIENCY: Status: ACTIVE | Noted: 2023-10-04

## 2023-10-04 PROBLEM — G47.33 OBSTRUCTIVE SLEEP APNEA SYNDROME: Status: ACTIVE | Noted: 2017-10-12

## 2023-10-04 PROBLEM — E66.9 OBESITY: Status: ACTIVE | Noted: 2020-05-29

## 2023-10-04 PROBLEM — N32.3 BLADDER DIVERTICULUM: Status: ACTIVE | Noted: 2020-10-07

## 2023-10-04 PROBLEM — R60.9 EDEMA: Status: ACTIVE | Noted: 2023-10-04

## 2023-10-04 PROBLEM — L82.0 INFLAMED SEBORRHEIC KERATOSIS: Status: ACTIVE | Noted: 2023-10-04

## 2023-10-04 PROBLEM — E78.00 PURE HYPERCHOLESTEROLEMIA: Status: ACTIVE | Noted: 2023-10-04

## 2023-10-04 PROBLEM — R91.8 PULMONARY NODULES: Status: ACTIVE | Noted: 2020-07-08

## 2023-10-04 PROBLEM — E78.5 HYPERLIPIDEMIA: Status: ACTIVE | Noted: 2020-01-02

## 2023-10-04 PROBLEM — G47.00 INSOMNIA: Status: ACTIVE | Noted: 2020-01-02

## 2023-10-04 PROBLEM — I10 ESSENTIAL HYPERTENSION: Status: ACTIVE | Noted: 2020-01-02

## 2023-10-04 PROBLEM — J43.9 PULMONARY EMPHYSEMA (HCC): Status: ACTIVE | Noted: 2020-07-08

## 2023-10-04 PROBLEM — I73.9 PVD (PERIPHERAL VASCULAR DISEASE) (HCC): Status: ACTIVE | Noted: 2022-07-22

## 2023-10-06 ENCOUNTER — OFFICE VISIT (OUTPATIENT)
Dept: PULMONOLOGY | Age: 78
End: 2023-10-06
Payer: MEDICARE

## 2023-10-06 VITALS
BODY MASS INDEX: 33.43 KG/M2 | SYSTOLIC BLOOD PRESSURE: 133 MMHG | OXYGEN SATURATION: 91 % | HEIGHT: 67 IN | TEMPERATURE: 97.8 F | WEIGHT: 213 LBS | RESPIRATION RATE: 16 BRPM | DIASTOLIC BLOOD PRESSURE: 88 MMHG | HEART RATE: 82 BPM

## 2023-10-06 DIAGNOSIS — J44.9 STAGE 1 MILD COPD BY GOLD CLASSIFICATION (HCC): ICD-10-CM

## 2023-10-06 DIAGNOSIS — Z87.891 PERSONAL HISTORY OF TOBACCO USE: Primary | ICD-10-CM

## 2023-10-06 DIAGNOSIS — Z23 NEED FOR VACCINATION: ICD-10-CM

## 2023-10-06 PROCEDURE — 1036F TOBACCO NON-USER: CPT | Performed by: INTERNAL MEDICINE

## 2023-10-06 PROCEDURE — G8417 CALC BMI ABV UP PARAM F/U: HCPCS | Performed by: INTERNAL MEDICINE

## 2023-10-06 PROCEDURE — 1123F ACP DISCUSS/DSCN MKR DOCD: CPT | Performed by: INTERNAL MEDICINE

## 2023-10-06 PROCEDURE — G0296 VISIT TO DETERM LDCT ELIG: HCPCS | Performed by: INTERNAL MEDICINE

## 2023-10-06 PROCEDURE — 99213 OFFICE O/P EST LOW 20 MIN: CPT | Performed by: INTERNAL MEDICINE

## 2023-10-06 PROCEDURE — 3023F SPIROM DOC REV: CPT | Performed by: INTERNAL MEDICINE

## 2023-10-06 PROCEDURE — 3078F DIAST BP <80 MM HG: CPT | Performed by: INTERNAL MEDICINE

## 2023-10-06 PROCEDURE — G8484 FLU IMMUNIZE NO ADMIN: HCPCS | Performed by: INTERNAL MEDICINE

## 2023-10-06 PROCEDURE — 3074F SYST BP LT 130 MM HG: CPT | Performed by: INTERNAL MEDICINE

## 2023-10-06 PROCEDURE — G8427 DOCREV CUR MEDS BY ELIG CLIN: HCPCS | Performed by: INTERNAL MEDICINE

## 2023-10-06 RX ORDER — ALBUTEROL SULFATE 2.5 MG/3ML
2.5 SOLUTION RESPIRATORY (INHALATION) EVERY 6 HOURS PRN
Qty: 30 EACH | Refills: 11 | Status: SHIPPED | OUTPATIENT
Start: 2023-10-06

## 2023-10-06 RX ORDER — UMECLIDINIUM BROMIDE AND VILANTEROL TRIFENATATE 62.5; 25 UG/1; UG/1
1 POWDER RESPIRATORY (INHALATION) DAILY
Qty: 1 EACH | Refills: 11 | Status: SHIPPED | OUTPATIENT
Start: 2023-10-06

## 2023-10-06 RX ORDER — ALBUTEROL SULFATE 90 UG/1
2 AEROSOL, METERED RESPIRATORY (INHALATION) EVERY 6 HOURS PRN
Qty: 1 EACH | Refills: 11 | Status: SHIPPED | OUTPATIENT
Start: 2023-10-06

## 2023-10-06 RX ORDER — MULTIVIT-MIN/FA/LYCOPEN/LUTEIN .4-300-25
1 TABLET ORAL DAILY
COMMUNITY

## 2023-10-06 ASSESSMENT — ENCOUNTER SYMPTOMS
SHORTNESS OF BREATH: 1
EYES NEGATIVE: 1
CHEST TIGHTNESS: 1

## 2023-11-09 ENCOUNTER — TELEPHONE (OUTPATIENT)
Dept: ONCOLOGY | Age: 78
End: 2023-11-09

## 2023-11-21 ENCOUNTER — TELEPHONE (OUTPATIENT)
Dept: PULMONOLOGY | Age: 78
End: 2023-11-21

## 2023-11-21 NOTE — TELEPHONE ENCOUNTER
Dr Lexie Art? Do you still want patient to get a CT lung screening? Are there possible other inhalers that we can try to get cheaper for him?

## 2023-11-21 NOTE — TELEPHONE ENCOUNTER
Patient called to ask why CT Lung screening was ordered as he states he does not see the need as he just had Chest Xray. Patient is asking if its is necessary? Patient also wanted to inform that the One of the 3 inhalers he was ordered cost 500.00 dollars and he did not pick it up as he could not afford it.

## 2023-11-28 NOTE — TELEPHONE ENCOUNTER
José Luis Harry, DO  You 1 minute ago (1:43 PM)       Tell him to ask pharmacist to recommend the least expensive inhalers for him that are similar to Anoro and ProAir and then let us know and I will order. Quit smoking 6 years ago but risk of lung cancer persists for 15yrs after quit smoking. CXR not a sensitive screen for lung cancer. Guidelines recommend a low radiation dose CT yearly since much more sensitive at picking up early lung cancers when potentially treatable. That is my recommendation as well but he can decide for himself.

## 2024-06-13 ENCOUNTER — TELEPHONE (OUTPATIENT)
Dept: PULMONOLOGY | Age: 79
End: 2024-06-13

## 2024-06-13 ENCOUNTER — NURSE ONLY (OUTPATIENT)
Dept: PULMONOLOGY | Age: 79
End: 2024-06-13

## 2024-06-13 VITALS — WEIGHT: 200 LBS | BODY MASS INDEX: 31.39 KG/M2 | OXYGEN SATURATION: 87 % | HEART RATE: 75 BPM | HEIGHT: 67 IN

## 2024-06-13 DIAGNOSIS — J44.9 STAGE 1 MILD COPD BY GOLD CLASSIFICATION (HCC): Primary | ICD-10-CM

## 2024-06-13 DIAGNOSIS — J10.1 INFLUENZA A: ICD-10-CM

## 2024-06-13 NOTE — PROGRESS NOTES
Oxygen evaluation:    Patient was in [place], came down with the Flu was started on oxygen therapy. Needs to be set up back here at home    SpO2 on room air at rest: 87%   SpO2 on 3 pulse dose 94%

## 2024-06-13 NOTE — TELEPHONE ENCOUNTER
Post hosp hypoxemia with SpO2 87% on RA at rest.  Titrated to 3lpm per nasal canula cont (prot and stationary)  Discussion re: risks benefits and rationale and he accepts.

## 2024-06-13 NOTE — TELEPHONE ENCOUNTER
Patient was into today for walk test. Patient was diagnosis with Flu A while out it [place]. Was also hospitalized out there as well. Was sent home with a [employer] oxygen concentrator. Needs to be set up here with oxygen. Patient was 87% on room air. Once on 3 pulse dose patient oxygen went up to 94%. Please write in a separate note his need for oxygen per his test today so I can send order to [employer] company here in [place]. Thank you.

## 2024-08-09 ENCOUNTER — OFFICE VISIT (OUTPATIENT)
Dept: PULMONOLOGY | Age: 79
End: 2024-08-09

## 2024-08-09 VITALS
HEIGHT: 68 IN | HEART RATE: 58 BPM | DIASTOLIC BLOOD PRESSURE: 85 MMHG | TEMPERATURE: 97.8 F | RESPIRATION RATE: 15 BRPM | OXYGEN SATURATION: 94 % | SYSTOLIC BLOOD PRESSURE: 145 MMHG | BODY MASS INDEX: 31.83 KG/M2 | WEIGHT: 210 LBS

## 2024-08-09 DIAGNOSIS — G47.33 OSA (OBSTRUCTIVE SLEEP APNEA): ICD-10-CM

## 2024-08-09 DIAGNOSIS — J96.11 CHRONIC HYPOXEMIC RESPIRATORY FAILURE (HCC): ICD-10-CM

## 2024-08-09 DIAGNOSIS — J43.2 CENTRILOBULAR EMPHYSEMA (HCC): Primary | ICD-10-CM

## 2024-08-09 DIAGNOSIS — I50.32 CHRONIC DIASTOLIC HEART FAILURE (HCC): ICD-10-CM

## 2024-08-09 DIAGNOSIS — J44.89 ASTHMA-COPD OVERLAP SYNDROME (HCC): ICD-10-CM

## 2024-08-09 DIAGNOSIS — I27.20 PULMONARY HYPERTENSION (HCC): ICD-10-CM

## 2024-08-09 DIAGNOSIS — Z87.891 HISTORY OF TOBACCO USE: ICD-10-CM

## 2024-08-09 RX ORDER — ALBUTEROL SULFATE 90 UG/1
2 AEROSOL, METERED RESPIRATORY (INHALATION) 4 TIMES DAILY PRN
Qty: 54 G | Refills: 1 | Status: SHIPPED | OUTPATIENT
Start: 2024-08-09

## 2024-08-09 RX ORDER — FLUTICASONE FUROATE, UMECLIDINIUM BROMIDE AND VILANTEROL TRIFENATATE 200; 62.5; 25 UG/1; UG/1; UG/1
1 POWDER RESPIRATORY (INHALATION) DAILY
Qty: 1 EACH | Refills: 5 | Status: SHIPPED | OUTPATIENT
Start: 2024-08-09

## 2024-08-09 ASSESSMENT — SLEEP AND FATIGUE QUESTIONNAIRES
ESS TOTAL SCORE: 12
HOW LIKELY ARE YOU TO NOD OFF OR FALL ASLEEP WHEN YOU ARE A PASSENGER IN A CAR FOR AN HOUR WITHOUT A BREAK: MODERATE CHANCE OF DOZING
HOW LIKELY ARE YOU TO NOD OFF OR FALL ASLEEP WHILE SITTING QUIETLY AFTER LUNCH WITHOUT ALCOHOL: SLIGHT CHANCE OF DOZING
HOW LIKELY ARE YOU TO NOD OFF OR FALL ASLEEP WHILE SITTING AND READING: SLIGHT CHANCE OF DOZING
HOW LIKELY ARE YOU TO NOD OFF OR FALL ASLEEP WHILE SITTING AND TALKING TO SOMEONE: SLIGHT CHANCE OF DOZING
HOW LIKELY ARE YOU TO NOD OFF OR FALL ASLEEP WHILE SITTING INACTIVE IN A PUBLIC PLACE: SLIGHT CHANCE OF DOZING
HOW LIKELY ARE YOU TO NOD OFF OR FALL ASLEEP IN A CAR, WHILE STOPPED FOR A FEW MINUTES IN TRAFFIC: SLIGHT CHANCE OF DOZING
HOW LIKELY ARE YOU TO NOD OFF OR FALL ASLEEP WHILE WATCHING TV: MODERATE CHANCE OF DOZING
HOW LIKELY ARE YOU TO NOD OFF OR FALL ASLEEP WHILE LYING DOWN TO REST IN THE AFTERNOON WHEN CIRCUMSTANCES PERMIT: HIGH CHANCE OF DOZING

## 2024-08-09 NOTE — PROGRESS NOTES
MG tablet lisinopril 40 mg tablet   TAKE 1 TABLET EVERY DAY (APPOINTMENT IS NEEDED)      metoprolol tartrate (LOPRESSOR) 25 MG tablet       naproxen sodium (ANAPROX) 220 MG tablet Take 1 tablet by mouth as needed      nitroGLYCERIN (NITROSTAT) 0.4 MG SL tablet Nitrostat 0.4 mg sublingual tablet   Place 1 tablet as needed by sublingual route.      psyllium (METAMUCIL) 58.6 % packet Take 1 packet by mouth daily  (Patient not taking: Reported on 10/6/2023)       No current facility-administered medications for this visit.          Diagnostic Labs:    LABORATORY DATA:  [] Ordered [] Unavailable [x] Reviewed [] Independently interpreted  ABG:   No results found for: \"PH\", \"PHART\", \"POCPH\", \"PCO2\", \"EOB9QWD\", \"POCPCO2\", \"PO2\", \"PO2ART\", \"POCPO2\", \"HCO3\", \"AJQ4EBH\", \"POCHCO3\", \"BE\", \"BEART\", \"O2SAT\", \"G8MWIWOE\", \"HUKG8NPZ\"  VBG:  No results found for: \"PHVEN\", \"CBN0LYR\", \"BEVEN\", \"W0RZWTPM\"  CBC:   Lab Results   Component Value Date    WBC 5.3 05/23/2018    RBC 4.59 05/23/2018    HGB 14.2 05/23/2018    HCT 44.2 05/23/2018     05/23/2018    MCV 96.3 05/23/2018    MCH 30.9 05/23/2018    MCHC 32.1 05/23/2018    RDW 12.6 05/23/2018    MONOPCT 12 05/23/2018    EOSPCT 2 05/23/2018    BASOPCT 0 05/23/2018    MONOSABS 0.64 05/23/2018    LYMPHSABS 1.77 05/23/2018    EOSABS 0.13 05/23/2018    BASOSABS <0.03 05/23/2018    DIFFTYPE NOT REPORTED 05/23/2018     Eosinophil Count: No data recorded  IgE: No results found for: \"IGE\"  Allergen panel:No results found for: \"REGVALL\"  Alpha-1 antitrypsin:No results found for: \"A1A\"  CMP:   Lab Results   Component Value Date     (H) 05/23/2018    K 4.1 05/23/2018     05/23/2018    CO2 25 05/23/2018    BUN 27 (H) 05/23/2018    CREATININE 0.96 05/23/2018    GLUCOSE 103 (H) 05/23/2018    CALCIUM 9.1 05/23/2018    BILITOT 0.45 05/23/2018    ALT 25 05/23/2018    AST 22 05/23/2018    ALKPHOS 109 05/23/2018     Coagulation Profile:   No results found for: \"INR\", \"PROTIME\",

## 2024-08-13 ENCOUNTER — TELEPHONE (OUTPATIENT)
Dept: PULMONOLOGY | Age: 79
End: 2024-08-13

## 2024-08-13 DIAGNOSIS — Z87.891 PERSONAL HISTORY OF TOBACCO USE: Primary | ICD-10-CM

## 2024-08-13 NOTE — TELEPHONE ENCOUNTER
CT lung screen was scheduled on 9/23/24 under Dr. Gamble's name. Pt needs to r/s and a new order needs to be placed under your name please. Order is pending for your review. Please sign if you agree.

## 2024-08-16 NOTE — TELEPHONE ENCOUNTER
Staff reached out to the pharmacy and pts insurance but was given no information. LMOM for pt informing him to call his prescription plan for a list of inhalers that are comparable to Trelegy. Once he has the list pt was asked to call the office to inform staff.

## 2024-08-20 ENCOUNTER — HOSPITAL ENCOUNTER (OUTPATIENT)
Dept: SLEEP CENTER | Age: 79
Discharge: HOME | End: 2024-08-22
Attending: STUDENT IN AN ORGANIZED HEALTH CARE EDUCATION/TRAINING PROGRAM
Payer: MEDICARE

## 2024-08-20 DIAGNOSIS — G47.33 OSA (OBSTRUCTIVE SLEEP APNEA): ICD-10-CM

## 2024-08-20 PROCEDURE — G0399 HOME SLEEP TEST/TYPE 3 PORTA: HCPCS

## 2024-09-02 LAB — STATUS: NORMAL

## 2024-09-24 ENCOUNTER — HOSPITAL ENCOUNTER (OUTPATIENT)
Dept: PULMONOLOGY | Age: 79
Discharge: HOME | End: 2024-09-24
Attending: STUDENT IN AN ORGANIZED HEALTH CARE EDUCATION/TRAINING PROGRAM
Payer: MEDICARE

## 2024-09-24 ENCOUNTER — HOSPITAL ENCOUNTER (OUTPATIENT)
Dept: CT IMAGING | Age: 79
Discharge: HOME | End: 2024-09-26
Attending: STUDENT IN AN ORGANIZED HEALTH CARE EDUCATION/TRAINING PROGRAM
Payer: MEDICARE

## 2024-09-24 ENCOUNTER — TELEPHONE (OUTPATIENT)
Dept: PULMONOLOGY | Age: 79
End: 2024-09-24

## 2024-09-24 VITALS — HEIGHT: 68 IN | BODY MASS INDEX: 31.83 KG/M2 | WEIGHT: 210 LBS

## 2024-09-24 DIAGNOSIS — J96.11 CHRONIC HYPOXEMIC RESPIRATORY FAILURE: ICD-10-CM

## 2024-09-24 DIAGNOSIS — Z87.891 PERSONAL HISTORY OF TOBACCO USE: ICD-10-CM

## 2024-09-24 PROCEDURE — 71271 CT THORAX LUNG CANCER SCR C-: CPT

## 2024-09-24 PROCEDURE — 94618 PULMONARY STRESS TESTING: CPT

## 2024-09-24 ASSESSMENT — 6 MINUTE WALK TEST (6MWT)
BORG DYSPNEA SCALE SCORE: VERY SLIGHT
BLOOD PRESSURE: 114/72
O2 SATURATION: 91%
ADDTIONAL O2 FLOW RATE (L/MIN): YES
BLOOD PRESSURE: 112/68
BORG FATIGUE SCALE SCORE: VERY SLIGHT
O2 FLOW RATE (L/MIN): 2
O2 SATURATION 2: 95
BLOOD PRESSURE 1: 110/67
BORG DYSPNEA SCALE SCORE: MODERATE
BORG FATIGUE SCALE SCORE: MODERATE
OXYGEN DEVICE: ROOM AIR
HEART RATE: 79
O2 SATURATION: 95%
DID PATIENT STOP OR PAUSE BEFORE 6 MINUTES?: YES
O2 SATURATION: 87%
SYMPTOMS: SOB
BORG DYSPNEA SCALE SCORE: MODERATE
BLOOD PRESSURE: 110/68
BORG FATIGUE SCALE SCORE: MODERATE
TOTAL DISTANCE WALKED (M): 332
% PREDICTED: 76.32
HEART RATE: 87
HEART RATE: 78
O2 FLOW RATE 2 (L/MIN): 2
HEART RATE: 80

## 2024-09-27 ENCOUNTER — OFFICE VISIT (OUTPATIENT)
Dept: PULMONOLOGY | Age: 79
End: 2024-09-27

## 2024-09-27 VITALS
BODY MASS INDEX: 31.83 KG/M2 | OXYGEN SATURATION: 92 % | WEIGHT: 210 LBS | SYSTOLIC BLOOD PRESSURE: 110 MMHG | RESPIRATION RATE: 14 BRPM | TEMPERATURE: 97.4 F | HEIGHT: 68 IN | HEART RATE: 76 BPM | DIASTOLIC BLOOD PRESSURE: 67 MMHG

## 2024-09-27 DIAGNOSIS — J43.2 CENTRILOBULAR EMPHYSEMA (HCC): ICD-10-CM

## 2024-09-27 DIAGNOSIS — J44.89 ASTHMA-COPD OVERLAP SYNDROME (HCC): ICD-10-CM

## 2024-09-27 DIAGNOSIS — I50.32 CHRONIC DIASTOLIC HEART FAILURE (HCC): ICD-10-CM

## 2024-09-27 DIAGNOSIS — I27.20 PULMONARY HYPERTENSION (HCC): ICD-10-CM

## 2024-09-27 DIAGNOSIS — G47.33 OSA ON CPAP: Primary | ICD-10-CM

## 2024-09-27 DIAGNOSIS — R91.1 LUNG NODULE: ICD-10-CM

## 2024-09-27 DIAGNOSIS — J96.11 CHRONIC HYPOXEMIC RESPIRATORY FAILURE: ICD-10-CM

## 2024-09-27 DIAGNOSIS — Z87.891 STOPPED SMOKING WITH GREATER THAN 40 PACK YEAR HISTORY: ICD-10-CM

## 2024-09-27 RX ORDER — FLUTICASONE PROPIONATE AND SALMETEROL 500; 50 UG/1; UG/1
1 POWDER RESPIRATORY (INHALATION) EVERY 12 HOURS
Qty: 60 EACH | Refills: 3 | Status: SHIPPED | OUTPATIENT
Start: 2024-09-27

## 2024-09-27 RX ORDER — UMECLIDINIUM 62.5 UG/1
1 AEROSOL, POWDER ORAL DAILY
Qty: 1 EACH | Refills: 6 | Status: SHIPPED | OUTPATIENT
Start: 2024-09-27

## 2024-09-27 ASSESSMENT — SLEEP AND FATIGUE QUESTIONNAIRES
HOW LIKELY ARE YOU TO NOD OFF OR FALL ASLEEP WHILE SITTING QUIETLY AFTER LUNCH WITHOUT ALCOHOL: SLIGHT CHANCE OF DOZING
HOW LIKELY ARE YOU TO NOD OFF OR FALL ASLEEP WHEN YOU ARE A PASSENGER IN A CAR FOR AN HOUR WITHOUT A BREAK: MODERATE CHANCE OF DOZING
HOW LIKELY ARE YOU TO NOD OFF OR FALL ASLEEP WHILE SITTING INACTIVE IN A PUBLIC PLACE: SLIGHT CHANCE OF DOZING
ESS TOTAL SCORE: 14
HOW LIKELY ARE YOU TO NOD OFF OR FALL ASLEEP WHILE SITTING AND TALKING TO SOMEONE: SLIGHT CHANCE OF DOZING
HOW LIKELY ARE YOU TO NOD OFF OR FALL ASLEEP WHILE WATCHING TV: MODERATE CHANCE OF DOZING
HOW LIKELY ARE YOU TO NOD OFF OR FALL ASLEEP WHILE SITTING AND READING: HIGH CHANCE OF DOZING
HOW LIKELY ARE YOU TO NOD OFF OR FALL ASLEEP WHILE LYING DOWN TO REST IN THE AFTERNOON WHEN CIRCUMSTANCES PERMIT: HIGH CHANCE OF DOZING
HOW LIKELY ARE YOU TO NOD OFF OR FALL ASLEEP IN A CAR, WHILE STOPPED FOR A FEW MINUTES IN TRAFFIC: SLIGHT CHANCE OF DOZING

## 2025-05-09 ENCOUNTER — TELEPHONE (OUTPATIENT)
Dept: PULMONOLOGY | Age: 80
End: 2025-05-09

## 2025-05-09 ENCOUNTER — OFFICE VISIT (OUTPATIENT)
Dept: PULMONOLOGY | Age: 80
End: 2025-05-09
Payer: MEDICARE

## 2025-05-09 VITALS
HEIGHT: 68 IN | WEIGHT: 215.2 LBS | OXYGEN SATURATION: 90 % | SYSTOLIC BLOOD PRESSURE: 144 MMHG | HEART RATE: 68 BPM | DIASTOLIC BLOOD PRESSURE: 85 MMHG | BODY MASS INDEX: 32.61 KG/M2 | RESPIRATION RATE: 12 BRPM

## 2025-05-09 DIAGNOSIS — E66.811 CLASS 1 OBESITY DUE TO EXCESS CALORIES WITH SERIOUS COMORBIDITY AND BODY MASS INDEX (BMI) OF 32.0 TO 32.9 IN ADULT: ICD-10-CM

## 2025-05-09 DIAGNOSIS — R91.1 LUNG NODULE: ICD-10-CM

## 2025-05-09 DIAGNOSIS — J44.9 STAGE 2 MODERATE COPD BY GOLD CLASSIFICATION (HCC): Primary | ICD-10-CM

## 2025-05-09 DIAGNOSIS — G47.33 OSA (OBSTRUCTIVE SLEEP APNEA): ICD-10-CM

## 2025-05-09 DIAGNOSIS — J44.89 ASTHMA-COPD OVERLAP SYNDROME (HCC): ICD-10-CM

## 2025-05-09 DIAGNOSIS — E66.09 CLASS 1 OBESITY DUE TO EXCESS CALORIES WITH SERIOUS COMORBIDITY AND BODY MASS INDEX (BMI) OF 32.0 TO 32.9 IN ADULT: ICD-10-CM

## 2025-05-09 PROCEDURE — 1123F ACP DISCUSS/DSCN MKR DOCD: CPT | Performed by: STUDENT IN AN ORGANIZED HEALTH CARE EDUCATION/TRAINING PROGRAM

## 2025-05-09 PROCEDURE — 3079F DIAST BP 80-89 MM HG: CPT | Performed by: STUDENT IN AN ORGANIZED HEALTH CARE EDUCATION/TRAINING PROGRAM

## 2025-05-09 PROCEDURE — 1036F TOBACCO NON-USER: CPT | Performed by: STUDENT IN AN ORGANIZED HEALTH CARE EDUCATION/TRAINING PROGRAM

## 2025-05-09 PROCEDURE — 3023F SPIROM DOC REV: CPT | Performed by: STUDENT IN AN ORGANIZED HEALTH CARE EDUCATION/TRAINING PROGRAM

## 2025-05-09 PROCEDURE — 99214 OFFICE O/P EST MOD 30 MIN: CPT | Performed by: STUDENT IN AN ORGANIZED HEALTH CARE EDUCATION/TRAINING PROGRAM

## 2025-05-09 PROCEDURE — G8428 CUR MEDS NOT DOCUMENT: HCPCS | Performed by: STUDENT IN AN ORGANIZED HEALTH CARE EDUCATION/TRAINING PROGRAM

## 2025-05-09 PROCEDURE — G8417 CALC BMI ABV UP PARAM F/U: HCPCS | Performed by: STUDENT IN AN ORGANIZED HEALTH CARE EDUCATION/TRAINING PROGRAM

## 2025-05-09 PROCEDURE — 3077F SYST BP >= 140 MM HG: CPT | Performed by: STUDENT IN AN ORGANIZED HEALTH CARE EDUCATION/TRAINING PROGRAM

## 2025-05-09 RX ORDER — BUDESONIDE 0.5 MG/2ML
500 INHALANT ORAL 2 TIMES DAILY
Qty: 60 EACH | Refills: 3 | Status: SHIPPED | OUTPATIENT
Start: 2025-05-09

## 2025-05-09 RX ORDER — UMECLIDINIUM 62.5 UG/1
1 AEROSOL, POWDER ORAL DAILY
Qty: 1 EACH | Refills: 6 | Status: SHIPPED | OUTPATIENT
Start: 2025-05-09

## 2025-05-09 RX ORDER — ALBUTEROL SULFATE 90 UG/1
2 INHALANT RESPIRATORY (INHALATION) EVERY 6 HOURS PRN
Qty: 1 EACH | Refills: 11 | Status: SHIPPED | OUTPATIENT
Start: 2025-05-09

## 2025-05-09 RX ORDER — FLUTICASONE PROPIONATE AND SALMETEROL 500; 50 UG/1; UG/1
1 POWDER RESPIRATORY (INHALATION) EVERY 12 HOURS
Qty: 60 EACH | Refills: 3 | Status: SHIPPED | OUTPATIENT
Start: 2025-05-09 | End: 2025-05-14

## 2025-05-09 ASSESSMENT — SLEEP AND FATIGUE QUESTIONNAIRES
HOW LIKELY ARE YOU TO NOD OFF OR FALL ASLEEP WHILE LYING DOWN TO REST IN THE AFTERNOON WHEN CIRCUMSTANCES PERMIT: HIGH CHANCE OF DOZING
HOW LIKELY ARE YOU TO NOD OFF OR FALL ASLEEP WHEN YOU ARE A PASSENGER IN A CAR FOR AN HOUR WITHOUT A BREAK: MODERATE CHANCE OF DOZING
HOW LIKELY ARE YOU TO NOD OFF OR FALL ASLEEP IN A CAR, WHILE STOPPED FOR A FEW MINUTES IN TRAFFIC: MODERATE CHANCE OF DOZING
HOW LIKELY ARE YOU TO NOD OFF OR FALL ASLEEP WHILE SITTING AND TALKING TO SOMEONE: SLIGHT CHANCE OF DOZING
HOW LIKELY ARE YOU TO NOD OFF OR FALL ASLEEP WHILE SITTING AND READING: MODERATE CHANCE OF DOZING
HOW LIKELY ARE YOU TO NOD OFF OR FALL ASLEEP WHILE WATCHING TV: HIGH CHANCE OF DOZING
HOW LIKELY ARE YOU TO NOD OFF OR FALL ASLEEP WHILE SITTING INACTIVE IN A PUBLIC PLACE: SLIGHT CHANCE OF DOZING
HOW LIKELY ARE YOU TO NOD OFF OR FALL ASLEEP WHILE SITTING QUIETLY AFTER LUNCH WITHOUT ALCOHOL: SLIGHT CHANCE OF DOZING
ESS TOTAL SCORE: 15

## 2025-05-09 NOTE — TELEPHONE ENCOUNTER
Denied  PA Detail   Note from payer: Denied. BUDESONIDE Suspension is used in a nebulizer. A nebulizer is a piece of durable medical equipment (DME). Drugs used with DME in the home are covered under Medicare Part B. Our records show that you do not live in a long term care (LTC) facility. We cannot pay for drugs under Medicare Part D if they are covered under Medicare Part A or B. We did not decide whether BUDESONIDE Suspension is medically necessary. We made our decision only on the fact that we cannot pay for the drug under Medicare Part D. For more information, talk to your prescriber or call [phone]- [phone].  Payer: Auto Search Patient's Payer Case ID: [id number]    [phone]  Electronic appeal: Not supported  Prior auth initiated by: [facility] [id number] [place] [facility] [address] [facility] -  [phone] - F [phone]  View History

## 2025-05-09 NOTE — TELEPHONE ENCOUNTER
Received a PA notice for budesonide (PULMICORT) 0.5 MG/2ML nebulizer suspension.  This is in the process with covermymeds.

## 2025-05-10 NOTE — PROGRESS NOTES
[facility]  Office visit follow-up  ______________________________________________________________________________    Patient: Macario Galindo  YOB: 1945   MRN: [id number]    Acct:      Today's date: 05/09/25    Chief complaint   Follow up     History of present illness     A 79 y.o. male       Pt seen and Chart reviewed.  Macario Galindo is here in followup for   1. Stage 2 moderate COPD by GOLD classification ([facility])    2. Asthma-COPD overlap syndrome (HCC)    3. RO (obstructive sleep apnea)    4. Class 1 obesity due to excess calories with serious comorbidity and body mass index (BMI) of 32.0 to 32.9 in adult          Interval history: 05/09/25  History of Present Illness  The patient presents for evaluation of shortness of breath and sleep apnea.    He reports intermittent episodes of dyspnea, which he manages with a CPAP machine. However, he experiences difficulty maintaining the mask throughout the night, often removing it after 4 to 5 hours of use. His symptoms remain consistent with his previous visit. He does not experience shortness of breath at rest but reports exertional dyspnea during activities such as ascending stairs. He also reports daytime fatigue, attributing it to insufficient sleep. He monitors his oxygen saturation at home, typically around 92%, and uses supplemental oxygen when levels drop to 88%. He occasionally experiences wheezing, particularly when his lungs are congested, but currently reports clear lung fields. He expresses a preference for nebulizer treatments and uses albuterol once or twice daily. His current treatment regimen includes Wixela and albuterol inhalers.    He reports that the CPAP machine aids in initiating sleep but does not prevent awakenings. He is considering the use of a face mask with the CPAP machine.    The compliance report from 1/29/2025-4/28/2025  Showed the patient is 80% compliance with the CPAP use about 50%

## 2025-05-14 RX ORDER — FLUTICASONE PROPIONATE AND SALMETEROL XINAFOATE 45; 21 UG/1; UG/1
2 AEROSOL, METERED RESPIRATORY (INHALATION) 2 TIMES DAILY
Qty: 1 EACH | Refills: 6 | Status: SHIPPED | OUTPATIENT
Start: 2025-05-14 | End: 2025-05-15

## 2025-05-15 ENCOUNTER — TELEPHONE (OUTPATIENT)
Dept: PULMONOLOGY | Age: 80
End: 2025-05-15

## 2025-05-15 RX ORDER — FLUTICASONE PROPIONATE AND SALMETEROL XINAFOATE 45; 21 UG/1; UG/1
2 AEROSOL, METERED RESPIRATORY (INHALATION) 2 TIMES DAILY
Qty: 1 EACH | Refills: 6 | Status: SHIPPED | OUTPATIENT
Start: 2025-05-15

## 2025-05-15 NOTE — TELEPHONE ENCOUNTER
[facility] called and stated that you're last office note need to be addended to state that patient is benefiting from the use of the cpap

## 2025-05-20 RX ORDER — FLUTICASONE PROPIONATE AND SALMETEROL XINAFOATE 45; 21 UG/1; UG/1
2 AEROSOL, METERED RESPIRATORY (INHALATION) 2 TIMES DAILY
Qty: 1 EACH | Refills: 6 | Status: SHIPPED | OUTPATIENT
Start: 2025-05-20

## 2025-05-22 DIAGNOSIS — J44.89 ASTHMA-COPD OVERLAP SYNDROME (HCC): ICD-10-CM

## 2025-05-22 NOTE — TELEPHONE ENCOUNTER
RX prescribed 5/9/25, pharmacy is requesting a 90 day supply. Please sign for refill if ok. Thank you.

## 2025-05-23 RX ORDER — BUDESONIDE 0.5 MG/2ML
INHALANT ORAL
Qty: 360 ML | Refills: 1 | Status: SHIPPED | OUTPATIENT
Start: 2025-05-23

## 2025-05-27 RX ORDER — FLUTICASONE FUROATE, UMECLIDINIUM BROMIDE AND VILANTEROL TRIFENATATE 200; 62.5; 25 UG/1; UG/1; UG/1
1 POWDER RESPIRATORY (INHALATION) DAILY
Qty: 1 EACH | Refills: 0 | Status: SHIPPED | COMMUNITY
Start: 2025-05-27

## 2025-05-27 NOTE — TELEPHONE ENCOUNTER
Patient called and requested a sample of Trelegy being that he can get on a proggram to get Trelegy for free.  I did explain to patient that with the trelegy he does not want to be taking the Advair as well.  Patient stated he will stop Advair and just try trelegy for now.  I did give him 1 sample being we have prescribed in the past.  Please sign off on sample

## 2025-05-28 ENCOUNTER — TELEPHONE (OUTPATIENT)
Dept: PULMONOLOGY | Age: 80
End: 2025-05-28

## 2025-05-29 NOTE — TELEPHONE ENCOUNTER
DENIED - PHARMACY is billing under Medicare part \"D\" and they need to process under Medicare Part \"B\"

## 2025-05-30 ENCOUNTER — TELEPHONE (OUTPATIENT)
Dept: PULMONOLOGY | Age: 80
End: 2025-05-30

## 2025-05-30 NOTE — TELEPHONE ENCOUNTER
Dr Groves ordered CT after patient was seen.  Called patient and transferred him to scheduling to schedule CT.

## 2025-06-16 ENCOUNTER — HOSPITAL ENCOUNTER (OUTPATIENT)
Dept: CT IMAGING | Age: 80
Discharge: HOME | End: 2025-06-18
Attending: STUDENT IN AN ORGANIZED HEALTH CARE EDUCATION/TRAINING PROGRAM
Payer: MEDICARE

## 2025-06-16 DIAGNOSIS — R91.1 LUNG NODULE: ICD-10-CM

## 2025-06-16 PROCEDURE — 71250 CT THORAX DX C-: CPT

## 2025-06-18 ENCOUNTER — TELEPHONE (OUTPATIENT)
Dept: PULMONOLOGY | Age: 80
End: 2025-06-18

## 2025-06-18 NOTE — TELEPHONE ENCOUNTER
Denied  PA Detail   Note from payer: Denied. BUDESONIDE Suspension is used in a nebulizer. A nebulizer is a piece of durable medical equipment (DME). Drugs used with DME in the home are covered under Medicare Part B. Our records show that you do not live in a long term care (LTC) facility. We cannot pay for drugs under Medicare Part D if they are covered under Medicare Part A or B. We did not decide whether BUDESONIDE Suspension is medically necessary. We made our decision only on the fact that we cannot pay for the drug under Medicare Part D. For more information, talk to your prescriber or call 3- 450[employer].  Payer: Auto [employer] Patient's Payer Case ID: [id number]    [phone]  Electronic appeal: Not supported  Prior auth initiated by: [facility] [id number] [place] [facility] [address] [facility] -  [phone] - F [phone]  View History

## 2025-06-18 NOTE — TELEPHONE ENCOUNTER
Received a PA notice for   Questions  Medication:budesonide (PULMICORT) 0.5 MG/2ML nebulizer suspension   This is in the process with covermymeds.

## 2025-06-18 NOTE — TELEPHONE ENCOUNTER
Pharmacy is processing this under Medicare part \"D\". This should be processed under Medicare part \"B\"

## 2025-06-26 ENCOUNTER — RESULTS FOLLOW-UP (OUTPATIENT)
Dept: PULMONOLOGY | Age: 80
End: 2025-06-26